# Patient Record
Sex: FEMALE | Employment: FULL TIME | URBAN - METROPOLITAN AREA
[De-identification: names, ages, dates, MRNs, and addresses within clinical notes are randomized per-mention and may not be internally consistent; named-entity substitution may affect disease eponyms.]

---

## 2017-02-21 PROBLEM — R10.13 EPIGASTRIC PAIN: Status: ACTIVE | Noted: 2017-02-21

## 2017-02-21 PROBLEM — R12 HEARTBURN: Status: ACTIVE | Noted: 2017-02-21

## 2017-02-21 PROBLEM — Z71.6 ENCOUNTER FOR TOBACCO USE CESSATION COUNSELING: Status: ACTIVE | Noted: 2017-02-21

## 2017-02-21 PROCEDURE — 83013 H PYLORI (C-13) BREATH: CPT | Performed by: FAMILY MEDICINE

## 2017-02-22 PROCEDURE — 86803 HEPATITIS C AB TEST: CPT | Performed by: FAMILY MEDICINE

## 2017-03-07 PROBLEM — R73.09 IMPAIRED GLUCOSE METABOLISM: Status: ACTIVE | Noted: 2017-03-07

## 2017-03-07 PROBLEM — R10.13 EPIGASTRIC PAIN: Status: RESOLVED | Noted: 2017-02-21 | Resolved: 2017-03-07

## 2017-03-07 PROBLEM — N94.9 PRESSURE OF FEMALE PERINEUM: Status: ACTIVE | Noted: 2017-03-07

## 2017-09-22 PROBLEM — R07.9 CHEST PAIN: Status: ACTIVE | Noted: 2017-09-22

## 2018-05-17 PROCEDURE — 87186 SC STD MICRODIL/AGAR DIL: CPT | Performed by: OBSTETRICS & GYNECOLOGY

## 2018-05-17 PROCEDURE — 87088 URINE BACTERIA CULTURE: CPT | Performed by: OBSTETRICS & GYNECOLOGY

## 2018-05-17 PROCEDURE — 87086 URINE CULTURE/COLONY COUNT: CPT | Performed by: OBSTETRICS & GYNECOLOGY

## 2018-05-18 PROCEDURE — 84146 ASSAY OF PROLACTIN: CPT | Performed by: OBSTETRICS & GYNECOLOGY

## 2018-12-10 PROBLEM — Z71.6 ENCOUNTER FOR TOBACCO USE CESSATION COUNSELING: Status: RESOLVED | Noted: 2017-02-21 | Resolved: 2018-12-10

## 2018-12-10 PROBLEM — E66.09 CLASS 1 OBESITY DUE TO EXCESS CALORIES WITHOUT SERIOUS COMORBIDITY WITH BODY MASS INDEX (BMI) OF 33.0 TO 33.9 IN ADULT: Status: ACTIVE | Noted: 2018-12-10

## 2018-12-10 PROBLEM — N94.9 PRESSURE OF FEMALE PERINEUM: Status: RESOLVED | Noted: 2017-03-07 | Resolved: 2018-12-10

## 2018-12-10 PROBLEM — R07.9 CHEST PAIN: Status: RESOLVED | Noted: 2017-09-22 | Resolved: 2018-12-10

## 2020-02-04 PROBLEM — R73.03 PREDIABETES: Status: ACTIVE | Noted: 2017-03-07

## 2020-02-21 PROBLEM — Z86.010 HISTORY OF COLONIC POLYPS: Status: ACTIVE | Noted: 2020-02-21

## 2021-04-23 PROBLEM — T78.3XXA ANGIOEDEMA: Status: ACTIVE | Noted: 2020-12-03

## 2021-08-27 PROBLEM — Z86.010 HISTORY OF COLONIC POLYPS: Status: RESOLVED | Noted: 2020-02-21 | Resolved: 2021-08-27

## 2021-09-01 PROBLEM — I10 PRIMARY HYPERTENSION: Status: ACTIVE | Noted: 2021-09-01

## 2021-09-01 PROBLEM — Z72.0 TOBACCO USE: Status: ACTIVE | Noted: 2021-09-01

## 2021-09-16 PROBLEM — M25.511 ACUTE PAIN OF RIGHT SHOULDER: Status: ACTIVE | Noted: 2021-09-16

## 2021-09-29 PROBLEM — M25.511 CHRONIC RIGHT SHOULDER PAIN: Status: ACTIVE | Noted: 2021-09-16

## 2021-09-29 PROBLEM — G89.29 CHRONIC RIGHT SHOULDER PAIN: Status: ACTIVE | Noted: 2021-09-16

## 2021-10-30 ENCOUNTER — TELEMEDICINE (OUTPATIENT)
Dept: TELEHEALTH | Age: 66
End: 2021-10-30

## 2021-10-30 DIAGNOSIS — Z02.9 ADMINISTRATIVE ENCOUNTER: Primary | ICD-10-CM

## 2021-10-30 PROCEDURE — 99499 UNLISTED E&M SERVICE: CPT | Performed by: NURSE PRACTITIONER

## 2021-10-30 NOTE — PROGRESS NOTES
Called patient via telephone and related that with facial and lip swelling a physical examination is in order.   She related that this has happened before and requested a video visit for \"an antibiotic or something\"  Reinforced that this could be dangerou
8